# Patient Record
Sex: FEMALE | Race: WHITE
[De-identification: names, ages, dates, MRNs, and addresses within clinical notes are randomized per-mention and may not be internally consistent; named-entity substitution may affect disease eponyms.]

---

## 2018-02-13 ENCOUNTER — HOSPITAL ENCOUNTER (EMERGENCY)
Dept: HOSPITAL 25 - UCEAST | Age: 28
Discharge: HOME | End: 2018-02-13
Payer: COMMERCIAL

## 2018-02-13 DIAGNOSIS — M62.830: Primary | ICD-10-CM

## 2018-02-13 DIAGNOSIS — M54.6: ICD-10-CM

## 2018-02-13 DIAGNOSIS — M54.2: ICD-10-CM

## 2018-02-13 DIAGNOSIS — Z88.5: ICD-10-CM

## 2018-02-13 PROCEDURE — G0463 HOSPITAL OUTPT CLINIC VISIT: HCPCS

## 2018-02-13 PROCEDURE — 99202 OFFICE O/P NEW SF 15 MIN: CPT

## 2018-02-13 NOTE — UC
Back Pain HPI





- HPI Summary


HPI Summary: 








Pt presents with upper and mid back pain for the last 2 weeks. She tells me 

that she works at a [employer] and is often lifting and moving heavy objects. 

She strains her back often, but it usually improves - but this time she has not 

been able to rest due to an increased work schedule. She has been taking 

ibuprofen with mild relief. Denies fever, chills, recent illness, cough, 

radiation of pain, numbness, tingling, or loss of bowel/bladder function.








- History of Current Complaint


Chief Complaint: UCBackPain


Stated Complaint: BACK PAIN


Time Seen by Provider: 02/13/18 14:18


Hx Obtained From: Patient


Hx Last Menstrual Period: 1/17/18


Onset/Duration: Gradual Onset


Timing: Constant


Severity Initially: Moderate


Severity Currently: Moderate


Pain Intensity: 5


Pain Scale Used: 0-10 Numeric


Character: Aching, Stiffness


Aggravating Factor(s): Movement, Lifting, Bending


Alleviating Factor(s): Rest





- Allergies/Home Medications


Allergies/Adverse Reactions: 


 Allergies











Allergy/AdvReac Type Severity Reaction Status Date / Time


 


hydrocodone Allergy  Rash And Verified 02/13/18 13:09





   Itching  


 


miconazole [From Monistat 3] Allergy  Rash And Verified 02/13/18 13:09





   Itching  


 


skin cleanser combination Allergy  Rash And Verified 02/13/18 13:09





no.17   Itching  





[From Monistat 3]     











Home Medications: 


 Home Medications





Acetaminophen [Tylophen] 2 tab PO ONCE PRN 02/13/18 [History Confirmed 02/13/18]


Multivitamin [Multivitamins] 1 tab PO DAILY 02/13/18 [History Confirmed 02/13/18

]











PMH/Surg Hx/FS Hx/Imm Hx


Previously Healthy: Yes





- Surgical History


Surgical History: None





- Family History


Known Family History: Positive: Unknown





- Social History


Occupation: Employed Full-time


Lives: With Family


Alcohol Use: None


Substance Use Type: None


Smoking Status (MU): Never Smoked Tobacco





Review of Systems


Constitutional: Negative


Skin: Negative


Respiratory: Negative


Cardiovascular: Negative


Gastrointestinal: Negative


Neurovascular: Negative


Musculoskeletal: Other: - Neck and mid back pain


Neurological: Negative


Psychological: Negative


All Other Systems Reviewed And Are Negative: Yes





Physical Exam


Triage Information Reviewed: Yes


Appearance: Well-Appearing, No Pain Distress, Well-Nourished


Vital Signs: 


 Initial Vital Signs











Temp  99.5 F   02/13/18 13:10


 


Pulse  72   02/13/18 13:10


 


Resp  16   02/13/18 13:10


 


BP  110/60   02/13/18 13:10


 


Pulse Ox  100   02/13/18 13:10











Vital Signs Reviewed: Yes


Neck: Positive: Supple, No Lymphadenopathy, Other: - FROM. NTTP


Respiratory: Positive: Lungs clear, Normal breath sounds, No respiratory 

distress, No accessory muscle use


Cardiovascular: Positive: RRR, No Murmur, Pulses Normal


Abdomen Description: Positive: Nontender, No Organomegaly, Soft.  Negative: CVA 

Tenderness (R), CVA Tenderness (L), Distended, Guarding


Bowel Sounds: Positive: Present


Musculoskeletal: Positive: Strength Intact - B/L UEs and LEs, ROM Intact - B/L 

UEs and LEs, No Edema, Other: - TTP over paraspinal muscles of upper back - 

trapezius. No vertebral tenderness.


Neurological: Positive: Alert, Other: - Sensations intact b/l UEs and LEs.


Psychological: Positive: Age Appropriate Behavior


Skin: Negative: rashes





Back Pain Course/Dx





- Course


Course Of Treatment: Suspect muscle strain/spasm. Toradol IM given today. 

Meloxicam and flexeril prn





- Differential Dx/Diagnosis


Provider Diagnoses: Upper back spasm





Discharge





- Discharge Plan


Condition: Stable


Disposition: HOME


Prescriptions: 


Cyclobenzaprine TAB* [Flexeril 10 MG TAB*] 10 mg PO BEDTIME PRN #10 tab


 PRN Reason: Pain


Meloxicam 7.5 mg PO 14 PRN #14 tablet


 PRN Reason: Pain


Patient Education Materials:  Muscle Strain (DC)


Forms:  *Work Release


Referrals: 


No Primary Care Phys,NOPCP [Primary Care Provider] - 


Additional Instructions: 


If you develop a fever, shortness of breath, chest pain, new or worsening 

symptoms - please call your PCP or go to the ED.

## 2019-11-04 ENCOUNTER — HOSPITAL ENCOUNTER (EMERGENCY)
Dept: HOSPITAL 25 - ED | Age: 29
Discharge: HOME | End: 2019-11-04
Payer: SELF-PAY

## 2019-11-04 VITALS — DIASTOLIC BLOOD PRESSURE: 84 MMHG | SYSTOLIC BLOOD PRESSURE: 124 MMHG

## 2019-11-04 DIAGNOSIS — Z88.8: ICD-10-CM

## 2019-11-04 DIAGNOSIS — Z88.5: ICD-10-CM

## 2019-11-04 DIAGNOSIS — N75.0: Primary | ICD-10-CM

## 2019-11-04 PROCEDURE — 99282 EMERGENCY DEPT VISIT SF MDM: CPT

## 2019-11-04 NOTE — ED
GI/ HPI





- HPI Summary


HPI Summary: 


Patient is a 29-year-old female who presents emergency department for Right 

vaginal area times several days.  Patient states she has a history of bartholin 

gland cyst and abscess.  Patient states she sees a specialist at [facility]. Patient 

states they recommend she have glands removed the patient does not want to have 

the procedure.  Patient states she has had them drained in the past by 

gynecology. Pt. states she called her GYN today but states that specialist had 

no availability this week. Patient states usually area opens and drains on its 

own.  Patient states area has become more swollen.  She has been doing warm 

soaks.  Otherwise denies fever, chills, abdominal pain.  Symptoms are mild in 

severity.  No significant past medical history otherwise.








- History of Current Complaint


Chief Complaint: EDUrogenitalProblems


Time Seen by Provider: 11/04/19 13:21


Stated Complaint: BARTHOLIN CYST PER PT


Hx Obtained From: Patient


Hx Last Menstrual Period: 1/17/18


Pain Intensity: 2





- Allergy/Home Medications


Allergies/Adverse Reactions: 


 Allergies











Allergy/AdvReac Type Severity Reaction Status Date / Time


 


hydrocodone Allergy  Rash And Verified 11/04/19 11:13





   Itching  


 


miconazole [From Monistat 3] Allergy  Rash And Verified 11/04/19 11:13





   Itching  


 


skin cleanser combination Allergy  Rash And Verified 11/04/19 11:13





no.17   Itching  





[From Monistat 3]     














PMH/Surg Hx/FS Hx/Imm Hx


Previously Healthy: Yes


Respiratory History: Reports: Hx Asthma


Infectious Disease History: No


Infectious Disease History: 


   Denies: Traveled Outside the [place] in Last 30 Days





- Family History


Known Family History: Positive: Unknown, Non-Contributory





- Social History


Occupation: Employed Full-time


Lives: With Family


Alcohol Use: None


Substance Use Type: Reports: None


Smoking Status (MU): Never Smoked Tobacco





Review of Systems


Constitutional: Negative


Negative: Fever, Chills


Gastrointestinal: Negative


Positive: other - right sided vaginal swelling and pain.


All Other Systems Reviewed And Are Negative: Yes





Physical Exam


Triage Information Reviewed: Yes


Vital Signs On Initial Exam: 


 Initial Vitals











Temp Pulse Resp BP Pulse Ox


 


 99.2 F   80   16   118/87   100 


 


 11/04/19 11:10  11/04/19 11:10  11/04/19 11:10  11/04/19 11:10  11/04/19 11:10











Vital Signs Reviewed: Yes


Appearance: Positive: Well-Appearing - Pt. sitting up in bed in NAD.


Skin: Positive: Warm, Dry


Head/Face: Positive: Normal Head/Face Inspection


Eyes: Positive: Normal, EOMI


Neck: Positive: Supple


Pelvic Exam: Positive: Other - Exam performed with female ED techRaegan. 

Edema and pain noted over right bartholin gland region. No definite area of 

fluctuance. No drainage. No overlying erythema or warmth.


Neurological: Positive: Normal, CN Intact II-III


Psychiatric: Positive: Affect/Mood Appropriate





Procedures





- Sedation


Patient Received Moderate/Deep Sedation with Procedure: No





Diagnostics





- Vital Signs


 Vital Signs











  Temp Pulse Resp BP Pulse Ox


 


 11/04/19 13:53  99.0 F  74  16  124/84  100


 


 11/04/19 11:10  99.2 F  80  16  118/87  100














- Laboratory


Lab Statement: Any lab studies that have been ordered have been reviewed, and 

results considered in the medical decision making process.





GIGU Course/Dx





- Course


Course Of Treatment: Pt. with enlargement and pain to right batholin gland. 

Discussed with pt. there is no obvious abscess to drain that I can see on exam 

and that procedure should be done by GYN. Pt. agreeable to antibx and continue 

warm soaks. To call her gyn today for an apt. Motrin for pain as directed. To 

return to er ifs xs change or worsen. Pt. understands and agrees with plan.





- Diagnoses


Provider Diagnoses: 


 Bartholin gland cyst








Discharge ED





- Sign-Out/Discharge


Documenting (check all that apply): Patient Departure





- Discharge Plan


Condition: Good


Disposition: HOME


Prescriptions: 


Clindamycin Cap(NF) [Clindamycin Cap 300 mg Cap(NF)] 300 mg PO Q6H #40 cap


Patient Education Materials:  Bartholin Cyst (ED)


Forms:  *Work Release


Referrals: 


Ernie Jacobo MD [Medical Doctor] - 


Additional Instructions: 


Please call your GYN today for an appointment as soon as possible


Antibiotic as directed  


Continue warm compresses as directed


Return to ER for fever, vomiting, increased pain/swelling





- Billing Disposition and Condition


Condition: GOOD


Disposition: Home

## 2019-11-06 ENCOUNTER — HOSPITAL ENCOUNTER (EMERGENCY)
Dept: HOSPITAL 25 - ED | Age: 29
LOS: 1 days | Discharge: HOME | End: 2019-11-07
Payer: SELF-PAY

## 2019-11-06 DIAGNOSIS — Z88.5: ICD-10-CM

## 2019-11-06 DIAGNOSIS — Z88.8: ICD-10-CM

## 2019-11-06 DIAGNOSIS — N76.4: ICD-10-CM

## 2019-11-06 DIAGNOSIS — N75.0: Primary | ICD-10-CM

## 2019-11-06 PROCEDURE — 87389 HIV-1 AG W/HIV-1&-2 AB AG IA: CPT

## 2019-11-06 PROCEDURE — 56420 I&D BARTHOLINS GLAND ABSCESS: CPT

## 2019-11-06 PROCEDURE — 99282 EMERGENCY DEPT VISIT SF MDM: CPT

## 2019-11-06 PROCEDURE — 36415 COLL VENOUS BLD VENIPUNCTURE: CPT

## 2019-11-07 VITALS — DIASTOLIC BLOOD PRESSURE: 73 MMHG | SYSTOLIC BLOOD PRESSURE: 127 MMHG

## 2019-11-07 RX ADMIN — BUPIVACAINE HYDROCHLORIDE ONE: 5 INJECTION, SOLUTION EPIDURAL; INTRACAUDAL; PERINEURAL at 01:05

## 2019-11-07 RX ADMIN — BUPIVACAINE HYDROCHLORIDE ONE ML: 5 INJECTION, SOLUTION EPIDURAL; INTRACAUDAL; PERINEURAL at 01:00

## 2019-11-07 NOTE — ED
GI/ HPI





- HPI Summary


HPI Summary: 


29 year old F presenting to [facility] complains of worsening right labia pain and 

swelling with associated fever 99.9F and chills since several days ago. Hx 

Bartholin cysts. Patient states she was seen on Monday 11/4/19 for her sx. The 

patient rates the pain 8/10 in severity. Symptoms aggravated by nothing. 

Symptoms alleviated by nothing. Patient states she has tried using heat and 

soaking with no relief. States she does not have a gynecologist.





- History of Current Complaint


Chief Complaint: EDRashSkinAbscess


Time Seen by Provider: 11/07/19 00:42


Stated Complaint: CYST PER PT


Hx Obtained From: Patient


Onset/Duration: Started Days Ago, Still Present


Timing: Constant


Current Severity: Moderate - 8/10


Pain Intensity: 8


Associated Signs and Symptoms: Positive: Fever, Chills


Aggravating Factor(s): Nothing


Alleviating Factor(s): Nothing





- Allergy/Home Medications


Allergies/Adverse Reactions: 


 Allergies











Allergy/AdvReac Type Severity Reaction Status Date / Time


 


hydrocodone Allergy  Rash And Verified 11/06/19 22:33





   Itching  


 


miconazole [From Monistat 3] Allergy  Rash And Verified 11/06/19 22:33





   Itching  


 


skin cleanser combination Allergy  Rash And Verified 11/06/19 22:33





no.17   Itching  





[From Monistat 3]     














PMH/Surg Hx/FS Hx/Imm Hx


Previously Healthy: No - Bartholin cysts


Respiratory History: Reports: Hx Asthma





- Surgical History


Surgical History: None


Infectious Disease History: No


Infectious Disease History: 


   Denies: Traveled Outside the [place] in Last 30 Days





- Family History


Known Family History: 


   Negative: Diabetes





- Social History


Alcohol Use: None


Substance Use Type: Reports: None


Smoking Status (MU): Never Smoked Tobacco





Review of Systems


Positive: Fever, Chills


Positive: other - right labia pain and swelling


All Other Systems Reviewed And Are Negative: Yes





Physical Exam





- Summary


Physical Exam Summary: 


Appearance: Well-appearing, Well-nourished, lying in bed comfortable


Skin: Warm, dry, no obvious rash


Eyes: sclera anicteric, no conjunctival pallor


ENT: mucous membranes moist


Neck: deferred


Respiratory: No signs of respiratory distress


Cardiovascular: Appears well perfused, pulses are nml


Abdomen: deferred


 exam chaperoned by hospital aide Mary: she has marked swelling of the right 

labia consistent with Bartholin cyst abscess 


Musculoskeletal: Moving all 4 extremities without obvious discomfort


Neurological: Awake and alert, mentation is normal, speech is fluent and 

appropriate


Psychiatric: affect is normal, does not appear anxious or depressed





Triage Information Reviewed: Yes


Vital Signs On Initial Exam: 


 Initial Vitals











Temp Pulse Resp BP Pulse Ox


 


 99.6 F   90   18   125/88   100 


 


 11/06/19 22:30  11/06/19 22:30  11/06/19 22:30  11/06/19 22:30  11/06/19 22:30











Vital Signs Reviewed: Yes





Procedures





- Sedation


Patient Received Moderate/Deep Sedation with Procedure: No





- Incision and Drainage


  ** Buttocks


Site: right labia


Anesthesia: Other - 0.5% bupivacaine


Instrument(s): Other - 11 blade





Diagnostics





- Vital Signs


 Vital Signs











  Temp Pulse Resp BP Pulse Ox


 


 11/06/19 22:30  99.6 F  90  18  125/88  100














- Laboratory


Lab Statement: Any lab studies that have been ordered have been reviewed, and 

results considered in the medical decision making process.





GIGU Course/Dx





- Course


Course Of Treatment: 29 year old F complains of worsening right labia pain and 

swelling with associated fever and chills since several days ago. Hx Bartholin 

cysts.  Physical exam findings: She has marked swelling of the right labia 

consistent with Bartholin cyst abscess.  0.5% bupivacaine was applied to the 

right labia abscess. The patient's right labia abscess was drained using an 11 

blade revealing a large amount of pus.  Patient will be discharged home with 

follow up from her primary care providder as soon as possible. Patient was 

instructed to return to Emergency Department for new or worsening symptoms. 

Patient understands and is agreeable to this plan.





- Diagnoses


Provider Diagnoses: 


 Bartholin cyst, Abscess








Discharge ED





- Sign-Out/Discharge


Documenting (check all that apply): Patient Departure - Discharge





- Discharge Plan


Condition: Good


Disposition: HOME


Patient Education Materials:  Bartholin Cyst (ED), Incision and Drainage (ED)


Forms:  *Work Release


Referrals: 


Ernie Jacobo MD [Medical Doctor] - As Soon As Possible





- Billing Disposition and Condition


Condition: GOOD


Disposition: Home





- Attestation Statements


Document Initiated by Scribe: Yes


Documenting Scribe: Zena De Jesus


Provider For Whom Scribe is Documenting (Include Credential): Darrion Jerez MD


Scribe Attestation: 


Zena WILKINS scribed for Darrion Jerez MD on 11/07/19 at 0511. 


Scribe Documentation Reviewed: Yes


Provider Attestation: 


The documentation as recorded by the scribe, Zena De Jesus accurately reflects 

the service I personally performed and the decisions made by me, Darrion Jerez MD


Status of Scribjenny Document: Viewed